# Patient Record
Sex: FEMALE | Race: WHITE | NOT HISPANIC OR LATINO | URBAN - METROPOLITAN AREA
[De-identification: names, ages, dates, MRNs, and addresses within clinical notes are randomized per-mention and may not be internally consistent; named-entity substitution may affect disease eponyms.]

---

## 2018-03-22 ENCOUNTER — OUTPATIENT (OUTPATIENT)
Dept: OUTPATIENT SERVICES | Facility: HOSPITAL | Age: 68
LOS: 1 days | End: 2018-03-22
Payer: COMMERCIAL

## 2018-03-22 VITALS
WEIGHT: 115.96 LBS | RESPIRATION RATE: 16 BRPM | SYSTOLIC BLOOD PRESSURE: 110 MMHG | HEIGHT: 59 IN | HEART RATE: 72 BPM | TEMPERATURE: 98 F | DIASTOLIC BLOOD PRESSURE: 78 MMHG | OXYGEN SATURATION: 99 %

## 2018-03-22 DIAGNOSIS — Z98.890 OTHER SPECIFIED POSTPROCEDURAL STATES: Chronic | ICD-10-CM

## 2018-03-22 DIAGNOSIS — K57.32 DIVERTICULITIS OF LARGE INTESTINE WITHOUT PERFORATION OR ABSCESS WITHOUT BLEEDING: ICD-10-CM

## 2018-03-22 LAB
ANION GAP SERPL CALC-SCNC: 12 MMOL/L — SIGNIFICANT CHANGE UP (ref 5–17)
BLD GP AB SCN SERPL QL: NEGATIVE — SIGNIFICANT CHANGE UP
BUN SERPL-MCNC: 20 MG/DL — SIGNIFICANT CHANGE UP (ref 7–23)
CALCIUM SERPL-MCNC: 9.7 MG/DL — SIGNIFICANT CHANGE UP (ref 8.4–10.5)
CHLORIDE SERPL-SCNC: 102 MMOL/L — SIGNIFICANT CHANGE UP (ref 96–108)
CO2 SERPL-SCNC: 26 MMOL/L — SIGNIFICANT CHANGE UP (ref 22–31)
CREAT SERPL-MCNC: 0.82 MG/DL — SIGNIFICANT CHANGE UP (ref 0.5–1.3)
GLUCOSE SERPL-MCNC: 133 MG/DL — HIGH (ref 70–99)
HBA1C BLD-MCNC: 5.4 % — SIGNIFICANT CHANGE UP (ref 4–5.6)
HCT VFR BLD CALC: 43.2 % — SIGNIFICANT CHANGE UP (ref 34.5–45)
HGB BLD-MCNC: 14 G/DL — SIGNIFICANT CHANGE UP (ref 11.5–15.5)
MCHC RBC-ENTMCNC: 30.2 PG — SIGNIFICANT CHANGE UP (ref 27–34)
MCHC RBC-ENTMCNC: 32.4 GM/DL — SIGNIFICANT CHANGE UP (ref 32–36)
MCV RBC AUTO: 93.1 FL — SIGNIFICANT CHANGE UP (ref 80–100)
NRBC # BLD: 0 /100 WBCS — SIGNIFICANT CHANGE UP (ref 0–0)
PLATELET # BLD AUTO: 242 K/UL — SIGNIFICANT CHANGE UP (ref 150–400)
POTASSIUM SERPL-MCNC: 4.6 MMOL/L — SIGNIFICANT CHANGE UP (ref 3.5–5.3)
POTASSIUM SERPL-SCNC: 4.6 MMOL/L — SIGNIFICANT CHANGE UP (ref 3.5–5.3)
RBC # BLD: 4.64 M/UL — SIGNIFICANT CHANGE UP (ref 3.8–5.2)
RBC # FLD: 13 % — SIGNIFICANT CHANGE UP (ref 10.3–14.5)
RH IG SCN BLD-IMP: POSITIVE — SIGNIFICANT CHANGE UP
SODIUM SERPL-SCNC: 140 MMOL/L — SIGNIFICANT CHANGE UP (ref 135–145)
WBC # BLD: 7.1 K/UL — SIGNIFICANT CHANGE UP (ref 3.8–10.5)
WBC # FLD AUTO: 7.1 K/UL — SIGNIFICANT CHANGE UP (ref 3.8–10.5)

## 2018-03-22 PROCEDURE — 85027 COMPLETE CBC AUTOMATED: CPT

## 2018-03-22 PROCEDURE — 86850 RBC ANTIBODY SCREEN: CPT

## 2018-03-22 PROCEDURE — 83036 HEMOGLOBIN GLYCOSYLATED A1C: CPT

## 2018-03-22 PROCEDURE — 86901 BLOOD TYPING SEROLOGIC RH(D): CPT

## 2018-03-22 PROCEDURE — 86900 BLOOD TYPING SEROLOGIC ABO: CPT

## 2018-03-22 PROCEDURE — G0463: CPT

## 2018-03-22 PROCEDURE — 80048 BASIC METABOLIC PNL TOTAL CA: CPT

## 2018-03-22 RX ORDER — CEFOTETAN DISODIUM 1 G
2 VIAL (EA) INJECTION ONCE
Qty: 0 | Refills: 0 | Status: DISCONTINUED | OUTPATIENT
Start: 2018-03-26 | End: 2018-03-27

## 2018-03-22 RX ORDER — LIDOCAINE HCL 20 MG/ML
0.2 VIAL (ML) INJECTION ONCE
Qty: 0 | Refills: 0 | Status: DISCONTINUED | OUTPATIENT
Start: 2018-03-26 | End: 2018-03-26

## 2018-03-22 RX ORDER — SODIUM CHLORIDE 9 MG/ML
3 INJECTION INTRAMUSCULAR; INTRAVENOUS; SUBCUTANEOUS EVERY 8 HOURS
Qty: 0 | Refills: 0 | Status: DISCONTINUED | OUTPATIENT
Start: 2018-03-26 | End: 2018-03-26

## 2018-03-22 NOTE — H&P PST ADULT - HISTORY OF PRESENT ILLNESS
diverticulitis 67 year old female with recurrent diverticulitis of large intestine with out perforation reports having scheduled for laparoscopic robotic sigmoid resection on 03/26/18.

## 2018-03-22 NOTE — H&P PST ADULT - NSANTHOSAYNRD_GEN_A_CORE
No. HUSSEIN screening performed.  STOP BANG Legend: 0-2 = LOW Risk; 3-4 = INTERMEDIATE Risk; 5-8 = HIGH Risk

## 2018-03-26 ENCOUNTER — INPATIENT (INPATIENT)
Facility: HOSPITAL | Age: 68
LOS: 2 days | Discharge: HOME | DRG: 331 | End: 2018-03-29
Attending: COLON & RECTAL SURGERY | Admitting: COLON & RECTAL SURGERY
Payer: COMMERCIAL

## 2018-03-26 VITALS
WEIGHT: 115.96 LBS | RESPIRATION RATE: 16 BRPM | HEART RATE: 92 BPM | TEMPERATURE: 98 F | DIASTOLIC BLOOD PRESSURE: 80 MMHG | HEIGHT: 59 IN | SYSTOLIC BLOOD PRESSURE: 125 MMHG | OXYGEN SATURATION: 100 %

## 2018-03-26 DIAGNOSIS — K57.32 DIVERTICULITIS OF LARGE INTESTINE WITHOUT PERFORATION OR ABSCESS WITHOUT BLEEDING: ICD-10-CM

## 2018-03-26 DIAGNOSIS — Z98.890 OTHER SPECIFIED POSTPROCEDURAL STATES: Chronic | ICD-10-CM

## 2018-03-26 LAB
GLUCOSE BLDC GLUCOMTR-MCNC: 87 MG/DL — SIGNIFICANT CHANGE UP (ref 70–99)
RH IG SCN BLD-IMP: POSITIVE — SIGNIFICANT CHANGE UP

## 2018-03-26 PROCEDURE — 88307 TISSUE EXAM BY PATHOLOGIST: CPT | Mod: 26

## 2018-03-26 RX ORDER — HEPARIN SODIUM 5000 [USP'U]/ML
5000 INJECTION INTRAVENOUS; SUBCUTANEOUS EVERY 8 HOURS
Qty: 0 | Refills: 0 | Status: DISCONTINUED | OUTPATIENT
Start: 2018-03-26 | End: 2018-03-29

## 2018-03-26 RX ORDER — ACETAMINOPHEN 500 MG
1000 TABLET ORAL ONCE
Qty: 0 | Refills: 0 | Status: COMPLETED | OUTPATIENT
Start: 2018-03-26 | End: 2018-03-26

## 2018-03-26 RX ORDER — MORPHINE SULFATE 50 MG/1
2 CAPSULE, EXTENDED RELEASE ORAL EVERY 4 HOURS
Qty: 0 | Refills: 0 | Status: DISCONTINUED | OUTPATIENT
Start: 2018-03-26 | End: 2018-03-28

## 2018-03-26 RX ORDER — HYDROMORPHONE HYDROCHLORIDE 2 MG/ML
0.5 INJECTION INTRAMUSCULAR; INTRAVENOUS; SUBCUTANEOUS
Qty: 0 | Refills: 0 | Status: DISCONTINUED | OUTPATIENT
Start: 2018-03-26 | End: 2018-03-26

## 2018-03-26 RX ORDER — CETIRIZINE HYDROCHLORIDE 10 MG/1
1 TABLET ORAL
Qty: 0 | Refills: 0 | COMMUNITY

## 2018-03-26 RX ORDER — SODIUM CHLORIDE 9 MG/ML
1000 INJECTION, SOLUTION INTRAVENOUS
Qty: 0 | Refills: 0 | Status: DISCONTINUED | OUTPATIENT
Start: 2018-03-26 | End: 2018-03-27

## 2018-03-26 RX ORDER — HYDROMORPHONE HYDROCHLORIDE 2 MG/ML
1 INJECTION INTRAMUSCULAR; INTRAVENOUS; SUBCUTANEOUS
Qty: 0 | Refills: 0 | Status: DISCONTINUED | OUTPATIENT
Start: 2018-03-26 | End: 2018-03-26

## 2018-03-26 RX ADMIN — HYDROMORPHONE HYDROCHLORIDE 0.5 MILLIGRAM(S): 2 INJECTION INTRAMUSCULAR; INTRAVENOUS; SUBCUTANEOUS at 13:55

## 2018-03-26 RX ADMIN — SODIUM CHLORIDE 100 MILLILITER(S): 9 INJECTION, SOLUTION INTRAVENOUS at 22:12

## 2018-03-26 RX ADMIN — HEPARIN SODIUM 5000 UNIT(S): 5000 INJECTION INTRAVENOUS; SUBCUTANEOUS at 22:12

## 2018-03-26 RX ADMIN — Medication 1000 MILLIGRAM(S): at 16:20

## 2018-03-26 RX ADMIN — Medication 400 MILLIGRAM(S): at 15:50

## 2018-03-26 RX ADMIN — HEPARIN SODIUM 5000 UNIT(S): 5000 INJECTION INTRAVENOUS; SUBCUTANEOUS at 13:29

## 2018-03-26 RX ADMIN — HYDROMORPHONE HYDROCHLORIDE 0.5 MILLIGRAM(S): 2 INJECTION INTRAMUSCULAR; INTRAVENOUS; SUBCUTANEOUS at 13:41

## 2018-03-26 RX ADMIN — SODIUM CHLORIDE 3 MILLILITER(S): 9 INJECTION INTRAMUSCULAR; INTRAVENOUS; SUBCUTANEOUS at 06:19

## 2018-03-26 RX ADMIN — Medication 400 MILLIGRAM(S): at 22:12

## 2018-03-26 NOTE — BRIEF OPERATIVE NOTE - PROCEDURE
<<-----Click on this checkbox to enter Procedure Laparoscopic sigmoidectomy  03/26/2018  robotic assisted  Active  [id number]

## 2018-03-26 NOTE — BRIEF OPERATIVE NOTE - OPERATION/FINDINGS
Robotic mobilization of the sigmoid and left colon. Superior hemorrhoidal vessels taken. Distal margin stapled with robotic stapler. Umbilical incision extended inferiorly and a small wound protector was placed. The proximal margin was taken with an automatic purse string device. The anastomosis was completed laparoscopically, complete donuts and negative leak test.

## 2018-03-27 LAB
ANION GAP SERPL CALC-SCNC: 8 MMOL/L — SIGNIFICANT CHANGE UP (ref 5–17)
BASOPHILS # BLD AUTO: 0.01 K/UL — SIGNIFICANT CHANGE UP (ref 0–0.2)
BASOPHILS NFR BLD AUTO: 0.1 % — SIGNIFICANT CHANGE UP (ref 0–2)
BUN SERPL-MCNC: 10 MG/DL — SIGNIFICANT CHANGE UP (ref 7–23)
CALCIUM SERPL-MCNC: 9.4 MG/DL — SIGNIFICANT CHANGE UP (ref 8.4–10.5)
CHLORIDE SERPL-SCNC: 107 MMOL/L — SIGNIFICANT CHANGE UP (ref 96–108)
CO2 SERPL-SCNC: 27 MMOL/L — SIGNIFICANT CHANGE UP (ref 22–31)
CREAT SERPL-MCNC: 0.84 MG/DL — SIGNIFICANT CHANGE UP (ref 0.5–1.3)
EOSINOPHIL # BLD AUTO: 0 K/UL — SIGNIFICANT CHANGE UP (ref 0–0.5)
EOSINOPHIL NFR BLD AUTO: 0 % — SIGNIFICANT CHANGE UP (ref 0–6)
GLUCOSE SERPL-MCNC: 114 MG/DL — HIGH (ref 70–99)
HCT VFR BLD CALC: 41.2 % — SIGNIFICANT CHANGE UP (ref 34.5–45)
HGB BLD-MCNC: 13.3 G/DL — SIGNIFICANT CHANGE UP (ref 11.5–15.5)
IMM GRANULOCYTES NFR BLD AUTO: 0.2 % — SIGNIFICANT CHANGE UP (ref 0–1.5)
LYMPHOCYTES # BLD AUTO: 1.66 K/UL — SIGNIFICANT CHANGE UP (ref 1–3.3)
LYMPHOCYTES # BLD AUTO: 13.7 % — SIGNIFICANT CHANGE UP (ref 13–44)
MCHC RBC-ENTMCNC: 30.8 PG — SIGNIFICANT CHANGE UP (ref 27–34)
MCHC RBC-ENTMCNC: 32.3 GM/DL — SIGNIFICANT CHANGE UP (ref 32–36)
MCV RBC AUTO: 95.4 FL — SIGNIFICANT CHANGE UP (ref 80–100)
MONOCYTES # BLD AUTO: 0.84 K/UL — SIGNIFICANT CHANGE UP (ref 0–0.9)
MONOCYTES NFR BLD AUTO: 6.9 % — SIGNIFICANT CHANGE UP (ref 2–14)
NEUTROPHILS # BLD AUTO: 9.55 K/UL — HIGH (ref 1.8–7.4)
NEUTROPHILS NFR BLD AUTO: 79.1 % — HIGH (ref 43–77)
PLATELET # BLD AUTO: 229 K/UL — SIGNIFICANT CHANGE UP (ref 150–400)
POTASSIUM SERPL-MCNC: 4.7 MMOL/L — SIGNIFICANT CHANGE UP (ref 3.5–5.3)
POTASSIUM SERPL-SCNC: 4.7 MMOL/L — SIGNIFICANT CHANGE UP (ref 3.5–5.3)
RBC # BLD: 4.32 M/UL — SIGNIFICANT CHANGE UP (ref 3.8–5.2)
RBC # FLD: 13.3 % — SIGNIFICANT CHANGE UP (ref 10.3–14.5)
SODIUM SERPL-SCNC: 142 MMOL/L — SIGNIFICANT CHANGE UP (ref 135–145)
WBC # BLD: 12.09 K/UL — HIGH (ref 3.8–10.5)
WBC # FLD AUTO: 12.09 K/UL — HIGH (ref 3.8–10.5)

## 2018-03-27 RX ORDER — DEXTROSE MONOHYDRATE, SODIUM CHLORIDE, AND POTASSIUM CHLORIDE 50; .745; 4.5 G/1000ML; G/1000ML; G/1000ML
1000 INJECTION, SOLUTION INTRAVENOUS
Qty: 0 | Refills: 0 | Status: DISCONTINUED | OUTPATIENT
Start: 2018-03-27 | End: 2018-03-28

## 2018-03-27 RX ORDER — ACETAMINOPHEN 500 MG
1000 TABLET ORAL ONCE
Qty: 0 | Refills: 0 | Status: COMPLETED | OUTPATIENT
Start: 2018-03-27 | End: 2018-03-27

## 2018-03-27 RX ORDER — MORPHINE SULFATE 50 MG/1
2 CAPSULE, EXTENDED RELEASE ORAL EVERY 6 HOURS
Qty: 0 | Refills: 0 | Status: DISCONTINUED | OUTPATIENT
Start: 2018-03-27 | End: 2018-03-28

## 2018-03-27 RX ORDER — OXYCODONE HYDROCHLORIDE 5 MG/1
5 TABLET ORAL EVERY 4 HOURS
Qty: 0 | Refills: 0 | Status: DISCONTINUED | OUTPATIENT
Start: 2018-03-27 | End: 2018-03-28

## 2018-03-27 RX ORDER — ACETAMINOPHEN 500 MG
650 TABLET ORAL EVERY 6 HOURS
Qty: 0 | Refills: 0 | Status: DISCONTINUED | OUTPATIENT
Start: 2018-03-27 | End: 2018-03-29

## 2018-03-27 RX ORDER — OXYCODONE HYDROCHLORIDE 5 MG/1
10 TABLET ORAL EVERY 6 HOURS
Qty: 0 | Refills: 0 | Status: DISCONTINUED | OUTPATIENT
Start: 2018-03-27 | End: 2018-03-28

## 2018-03-27 RX ADMIN — Medication 400 MILLIGRAM(S): at 11:40

## 2018-03-27 RX ADMIN — OXYCODONE HYDROCHLORIDE 5 MILLIGRAM(S): 5 TABLET ORAL at 20:10

## 2018-03-27 RX ADMIN — MORPHINE SULFATE 2 MILLIGRAM(S): 50 CAPSULE, EXTENDED RELEASE ORAL at 09:40

## 2018-03-27 RX ADMIN — Medication 1000 MILLIGRAM(S): at 12:18

## 2018-03-27 RX ADMIN — Medication 650 MILLIGRAM(S): at 18:28

## 2018-03-27 RX ADMIN — OXYCODONE HYDROCHLORIDE 5 MILLIGRAM(S): 5 TABLET ORAL at 20:40

## 2018-03-27 RX ADMIN — Medication 650 MILLIGRAM(S): at 17:59

## 2018-03-27 RX ADMIN — HEPARIN SODIUM 5000 UNIT(S): 5000 INJECTION INTRAVENOUS; SUBCUTANEOUS at 13:24

## 2018-03-27 RX ADMIN — MORPHINE SULFATE 2 MILLIGRAM(S): 50 CAPSULE, EXTENDED RELEASE ORAL at 15:00

## 2018-03-27 RX ADMIN — HEPARIN SODIUM 5000 UNIT(S): 5000 INJECTION INTRAVENOUS; SUBCUTANEOUS at 21:27

## 2018-03-27 RX ADMIN — MORPHINE SULFATE 2 MILLIGRAM(S): 50 CAPSULE, EXTENDED RELEASE ORAL at 05:25

## 2018-03-27 RX ADMIN — DEXTROSE MONOHYDRATE, SODIUM CHLORIDE, AND POTASSIUM CHLORIDE 50 MILLILITER(S): 50; .745; 4.5 INJECTION, SOLUTION INTRAVENOUS at 08:42

## 2018-03-27 RX ADMIN — SODIUM CHLORIDE 100 MILLILITER(S): 9 INJECTION, SOLUTION INTRAVENOUS at 05:42

## 2018-03-27 RX ADMIN — MORPHINE SULFATE 2 MILLIGRAM(S): 50 CAPSULE, EXTENDED RELEASE ORAL at 14:21

## 2018-03-27 RX ADMIN — MORPHINE SULFATE 2 MILLIGRAM(S): 50 CAPSULE, EXTENDED RELEASE ORAL at 10:15

## 2018-03-27 RX ADMIN — HEPARIN SODIUM 5000 UNIT(S): 5000 INJECTION INTRAVENOUS; SUBCUTANEOUS at 05:42

## 2018-03-27 RX ADMIN — MORPHINE SULFATE 2 MILLIGRAM(S): 50 CAPSULE, EXTENDED RELEASE ORAL at 05:55

## 2018-03-27 RX ADMIN — Medication 1000 MILLIGRAM(S): at 22:42

## 2018-03-28 LAB
HCT VFR BLD CALC: 41.3 % — SIGNIFICANT CHANGE UP (ref 34.5–45)
HGB BLD-MCNC: 13.7 G/DL — SIGNIFICANT CHANGE UP (ref 11.5–15.5)
MAGNESIUM SERPL-MCNC: 1.9 MG/DL — SIGNIFICANT CHANGE UP (ref 1.6–2.6)
MCHC RBC-ENTMCNC: 31.7 PG — SIGNIFICANT CHANGE UP (ref 27–34)
MCHC RBC-ENTMCNC: 33.1 GM/DL — SIGNIFICANT CHANGE UP (ref 32–36)
MCV RBC AUTO: 95.8 FL — SIGNIFICANT CHANGE UP (ref 80–100)
PHOSPHATE SERPL-MCNC: 2.5 MG/DL — SIGNIFICANT CHANGE UP (ref 2.5–4.5)
PLATELET # BLD AUTO: 197 K/UL — SIGNIFICANT CHANGE UP (ref 150–400)
RBC # BLD: 4.31 M/UL — SIGNIFICANT CHANGE UP (ref 3.8–5.2)
RBC # FLD: 12 % — SIGNIFICANT CHANGE UP (ref 10.3–14.5)
WBC # BLD: 8.4 K/UL — SIGNIFICANT CHANGE UP (ref 3.8–10.5)
WBC # FLD AUTO: 8.4 K/UL — SIGNIFICANT CHANGE UP (ref 3.8–10.5)

## 2018-03-28 RX ORDER — HYDROMORPHONE HYDROCHLORIDE 2 MG/ML
2 INJECTION INTRAMUSCULAR; INTRAVENOUS; SUBCUTANEOUS EVERY 4 HOURS
Qty: 0 | Refills: 0 | Status: DISCONTINUED | OUTPATIENT
Start: 2018-03-28 | End: 2018-03-29

## 2018-03-28 RX ORDER — ONDANSETRON 8 MG/1
4 TABLET, FILM COATED ORAL ONCE
Qty: 0 | Refills: 0 | Status: COMPLETED | OUTPATIENT
Start: 2018-03-28 | End: 2018-03-28

## 2018-03-28 RX ORDER — BENZOCAINE AND MENTHOL 5; 1 G/100ML; G/100ML
1 LIQUID ORAL
Qty: 0 | Refills: 0 | Status: DISCONTINUED | OUTPATIENT
Start: 2018-03-28 | End: 2018-03-29

## 2018-03-28 RX ADMIN — Medication 650 MILLIGRAM(S): at 23:17

## 2018-03-28 RX ADMIN — HEPARIN SODIUM 5000 UNIT(S): 5000 INJECTION INTRAVENOUS; SUBCUTANEOUS at 07:44

## 2018-03-28 RX ADMIN — Medication 650 MILLIGRAM(S): at 11:17

## 2018-03-28 RX ADMIN — OXYCODONE HYDROCHLORIDE 5 MILLIGRAM(S): 5 TABLET ORAL at 02:08

## 2018-03-28 RX ADMIN — ONDANSETRON 4 MILLIGRAM(S): 8 TABLET, FILM COATED ORAL at 09:42

## 2018-03-28 RX ADMIN — Medication 650 MILLIGRAM(S): at 17:18

## 2018-03-28 RX ADMIN — HEPARIN SODIUM 5000 UNIT(S): 5000 INJECTION INTRAVENOUS; SUBCUTANEOUS at 21:09

## 2018-03-28 RX ADMIN — HEPARIN SODIUM 5000 UNIT(S): 5000 INJECTION INTRAVENOUS; SUBCUTANEOUS at 13:46

## 2018-03-28 RX ADMIN — OXYCODONE HYDROCHLORIDE 5 MILLIGRAM(S): 5 TABLET ORAL at 01:38

## 2018-03-28 NOTE — ANESTHESIA FOLLOW-UP NOTE - NSEVALATIONFT_GEN_ALL_CORE
c/o mild sore throat, improving. Counseled pt if severe pain or trouble swallowing to contact MD. Also c/o nausea, rx antiemetics.

## 2018-03-29 VITALS
RESPIRATION RATE: 18 BRPM | HEART RATE: 72 BPM | OXYGEN SATURATION: 98 % | SYSTOLIC BLOOD PRESSURE: 123 MMHG | TEMPERATURE: 98 F | DIASTOLIC BLOOD PRESSURE: 27 MMHG

## 2018-03-29 PROCEDURE — 80048 BASIC METABOLIC PNL TOTAL CA: CPT

## 2018-03-29 PROCEDURE — 82962 GLUCOSE BLOOD TEST: CPT

## 2018-03-29 PROCEDURE — 86900 BLOOD TYPING SEROLOGIC ABO: CPT

## 2018-03-29 PROCEDURE — S2900: CPT

## 2018-03-29 PROCEDURE — 88307 TISSUE EXAM BY PATHOLOGIST: CPT

## 2018-03-29 PROCEDURE — 84100 ASSAY OF PHOSPHORUS: CPT

## 2018-03-29 PROCEDURE — 85027 COMPLETE CBC AUTOMATED: CPT

## 2018-03-29 PROCEDURE — 86901 BLOOD TYPING SEROLOGIC RH(D): CPT

## 2018-03-29 PROCEDURE — 83735 ASSAY OF MAGNESIUM: CPT

## 2018-03-29 RX ORDER — ACETAMINOPHEN 500 MG
2 TABLET ORAL
Qty: 0 | Refills: 0 | COMMUNITY
Start: 2018-03-29

## 2018-03-29 RX ADMIN — Medication 650 MILLIGRAM(S): at 05:17

## 2018-03-29 RX ADMIN — Medication 650 MILLIGRAM(S): at 00:00

## 2018-03-29 RX ADMIN — HEPARIN SODIUM 5000 UNIT(S): 5000 INJECTION INTRAVENOUS; SUBCUTANEOUS at 05:17

## 2018-03-29 NOTE — DISCHARGE NOTE ADULT - MEDICATION SUMMARY - MEDICATIONS TO TAKE
I will START or STAY ON the medications listed below when I get home from the hospital:    acetaminophen 325 mg oral tablet  -- 2 tab(s) by mouth every 6 hours, As needed, Mild Pain (1 - 3)  -- Indication: For mild pain    Percocet 5/325 oral tablet  -- 1 tab(s) by mouth every 6 hours MDD:4  -- Caution federal law prohibits the transfer of this drug to any person other  than the person for whom it was prescribed.  May cause drowsiness.  Alcohol may intensify this effect.  Use care when operating dangerous machinery.  This prescription cannot be refilled.  This product contains acetaminophen.  Do not use  with any other product containing acetaminophen to prevent possible liver damage.  Using more of this medication than prescribed may cause serious breathing problems.    -- Indication: For moderate pain    ZyrTEC 10 mg oral tablet  -- 1 tab(s) by mouth once a day, As Needed - for allergy symptoms  -- Indication: For antihistamine

## 2018-03-29 NOTE — DISCHARGE NOTE ADULT - PLAN OF CARE
recover from surgery WOUND CARE: Staples will be removed at follow up office visit.    BATHING: Please do not submerge wound underwater. You may shower and/or sponge bathe.  ACTIVITY: No heavy lifting anything more than 10-15lbs or straining. Otherwise, you may return to your usual level of physical activity. If you are taking narcotic pain medication (such as Percocet), do NOT drive a car, operate machinery or make important decisions.  DIET: Low fiber diet  NOTIFY YOUR SURGEON IF: You have any bleeding that does not stop, any pus draining from your wound, any fever (over 100.4 F) or chills, persistent nausea/vomiting with inability to tolerate food or liquids, persistent diarrhea, or if your pain is not controlled on your discharge pain medications.  FOLLOW-UP:  1. Please call to make a follow-up appointment with Dr. Smith within one week of discharge.   2. Please follow up with your primary care physician in one week regarding your hospitalization.

## 2018-03-29 NOTE — DISCHARGE NOTE ADULT - CARE PROVIDER_API CALL
Tom Smith), ColonRectal Surgery; Surgery  [address] [facility] [address] [place] [address]  Phone: [phone]  Fax: [phone]

## 2018-03-29 NOTE — DISCHARGE NOTE ADULT - INSTRUCTIONS
low fiber diet Instructed to call the MD with surgical site redness, fever, swelling, drainage, pain is not relieved with pain medications,. fever, chills, nausea vomitting and inability to tolerate food

## 2018-03-29 NOTE — DISCHARGE NOTE ADULT - HOSPITAL COURSE
67 year old female with recurrent diverticulitis of large intestine with out perforation reports having scheduled for laparoscopic robotic sigmoid resection on 03/26/18. Pt underwent lap robotic sigmoid resection on 3/26/18, pt tolerated procedure well, was extubated and sent to pacu.  Pt tolerating CLD, had return of GI fxn POD1, perkins removed, passed TOV.  PT had 1 episode of nausea 2/2 po oxy on empty stomach, zofran 4mg iv x 1 given, improved. On POD3, At the time of discharge, the patient was hemodynamically stable, was tolerating PO diet, was voiding urine and passing stool, was ambulating, and was comfortable with adequate pain control. The patient was instructed to follow up with Dr. Smith within 1-2 weeks after discharge from the hospital. The patient felt comfortable with discharge. The patient was discharged to home. The patient had no other issues.

## 2018-03-29 NOTE — DISCHARGE NOTE ADULT - NS AS ACTIVITY OBS
when taking pain medications/Showering allowed/Do not make important decisions/Do not drive or operate machinery/No Heavy lifting/straining

## 2018-03-29 NOTE — PROGRESS NOTE ADULT - ASSESSMENT
Ms. Castillo is a 66 yo woman s/p sigmoid resection. She is hemodynamically stable. Labs are pending .     - DVT ppx: SQH  - Diet: will advance to LRD  - Pain control: Acetaminophen, Morphine  - Monitor GI function  - Replete electrolyte as necessary  - Activity: OOb as tolerated  - Dispo: Home today or possibly tomorrow AM
66 yo F s/p irina sigmoid resection    - advance diet to LRD  - IVL  - cont oral pain meds  -zofran x 1  - d/c later today or tomorrow am      Cecilia WHELAN  CRSSNY
66 yo s/p robotic lower anterior resection  - cont clear liquids for breakfast and lunch if distention less  - oob/amb  - await further GI function   - pain control  - dvt ppx  - d/c perkins - pt would like to keep perkins i explained to her the risk of infection, when pt out of bed remove perkins please    Cecilia WHELAN  CRSSNY
68 yo F POD 3 s/p robotic assisted lap sigmoid resection for diverticulitis.  Doing well, likely d/c home today    - DVT ppx: SQH  - Diet: LRD  - Pain control: Oral pain medications  - Monitor GI function  - Replete electrolyte as necessary  - Activity: OOb as tolerated  - Colace BID  - Dispo: D/c Home today    Red  [id number]
68 yo F s/p irina sigmoid resection    - Tolerating LRD  - IVF  - cont oral pain meds  -zofran x 1  - Colace twice a day  - d/c later today if continues to tolerate, discharge instructions given
Assessment/Plan:  67yFemale with Lap robotic sigmoid resection (diverticulitis) doing well now on floor.     Plan:  -pain control  -DVT ppx  -CLD
Assessment/Plan:  67yFemale with Lap robotic sigmoid resection (diverticulitis) doing well now on floor.     Plan:  -pain control  -DVT ppx  -CLD  -d/c gregorio, TOV    [id number]

## 2018-03-29 NOTE — DISCHARGE NOTE ADULT - PATIENT PORTAL LINK FT
You can access the [facility] Patient Portal, offered by [facility], by registering with the following website: [email or web address]

## 2018-03-29 NOTE — DISCHARGE NOTE ADULT - CARE PLAN
Principal Discharge DX:	Diverticulitis large intestine  Goal:	recover from surgery Principal Discharge DX:	Diverticulitis large intestine  Goal:	recover from surgery  Assessment and plan of treatment:	WOUND CARE: Staples will be removed at follow up office visit.    BATHING: Please do not submerge wound underwater. You may shower and/or sponge bathe.  ACTIVITY: No heavy lifting anything more than 10-15lbs or straining. Otherwise, you may return to your usual level of physical activity. If you are taking narcotic pain medication (such as Percocet), do NOT drive a car, operate machinery or make important decisions.  DIET: Low fiber diet  NOTIFY YOUR SURGEON IF: You have any bleeding that does not stop, any pus draining from your wound, any fever (over 100.4 F) or chills, persistent nausea/vomiting with inability to tolerate food or liquids, persistent diarrhea, or if your pain is not controlled on your discharge pain medications.  FOLLOW-UP:  1. Please call to make a follow-up appointment with Dr. Smith within one week of discharge.   2. Please follow up with your primary care physician in one week regarding your hospitalization.

## 2018-03-29 NOTE — PROGRESS NOTE ADULT - SUBJECTIVE AND OBJECTIVE BOX
GENERAL SURGERY DAILY PROGRESS NOTE:       Subjective:    Pt stable overnight. Was nauseated when she recieved oxy on an empty stomach. Pt states this has happened before. Passing flatus, no bm. Urinating well. ambulating well.   Objective:  Gen- NAD  Abd- non distended, soft, non tender, incisions c/d/i    MEDICATIONS  (STANDING):  dextrose 5% + sodium chloride 0.45% with potassium chloride 20 mEq/L 1000 milliLiter(s) (50 mL/Hr) IV Continuous <Continuous>  heparin  Injectable 5000 Unit(s) SubCutaneous every 8 hours    MEDICATIONS  (PRN):  acetaminophen   Tablet. 650 milliGRAM(s) Oral every 6 hours PRN Mild Pain (1 - 3)  morphine  - Injectable 2 milliGRAM(s) IV Push every 6 hours PRN breakthrough  morphine  - Injectable 2 milliGRAM(s) IV Push every 4 hours PRN Moderate Pain (4 - 6)      Vital Signs Last 24 Hrs  T(C): 36.9 (28 Mar 2018 06:22), Max: 37.2 (27 Mar 2018 13:30)  T(F): 98.4 (28 Mar 2018 06:22), Max: 98.9 (27 Mar 2018 13:30)  HR: 68 (28 Mar 2018 06:22) (68 - 89)  BP: 129/78 (28 Mar 2018 06:22) (101/62 - 129/78)  BP(mean): --  RR: 18 (28 Mar 2018 06:22) (18 - 18)  SpO2: 96% (28 Mar 2018 06:22) (94% - 99%)    I&O's Detail    27 Mar 2018 07:01  -  28 Mar 2018 07:00  --------------------------------------------------------  IN:    dextrose 5% + sodium chloride 0.45% with potassium chloride 20 mEq/L: 600 mL    Oral Fluid: 880 mL  Total IN: 1480 mL    OUT:    Indwelling Catheter - Urethral: 880 mL    Voided: 1525 mL  Total OUT: 2405 mL    Total NET: -925 mL          Daily     Daily     LABS:                        13.3   12.09 )-----------( 229      ( 27 Mar 2018 09:14 )             41.2     03-27    142  |  107  |  10  ----------------------------<  114<H>  4.7   |  27  |  0.84    Ca    9.4      27 Mar 2018 07:12
GENERAL SURGERY DAILY PROGRESS NOTE:       Subjective:  Pt stable overnight. Tolerated clear liquids. Pt states she is passing flatus. No chest pain, no sob. Hesitant to take out her perkins.     Objective:  Gen- NAD  ABD- non tender, mildly distended, soft  incisions c/d/i except for her midline that has some serous staining on the superior portion     MEDICATIONS  (STANDING):  acetaminophen  IVPB. 1000 milliGRAM(s) IV Intermittent once  dextrose 5% + sodium chloride 0.45% with potassium chloride 20 mEq/L 1000 milliLiter(s) (50 mL/Hr) IV Continuous <Continuous>  heparin  Injectable 5000 Unit(s) SubCutaneous every 8 hours    MEDICATIONS  (PRN):  morphine  - Injectable 2 milliGRAM(s) IV Push every 4 hours PRN Moderate Pain (4 - 6)      Vital Signs Last 24 Hrs  T(C): 37.3 (27 Mar 2018 04:54), Max: 37.3 (27 Mar 2018 04:54)  T(F): 99.1 (27 Mar 2018 04:54), Max: 99.1 (27 Mar 2018 04:54)  HR: 84 (27 Mar 2018 04:54) (80 - 99)  BP: 110/67 (27 Mar 2018 04:54) (99/61 - 126/69)  BP(mean): 73 (26 Mar 2018 12:15) (73 - 86)  RR: 18 (27 Mar 2018 04:54) (16 - 18)  SpO2: 94% (27 Mar 2018 04:54) (93% - 100%)    I&O's Detail    26 Mar 2018 07:01  -  27 Mar 2018 07:00  --------------------------------------------------------  IN:    IV PiggyBack: 100 mL    lactated ringers.: 800 mL    Oral Fluid: 240 mL  Total IN: 1140 mL    OUT:    Indwelling Catheter - Urethral: 2215 mL  Total OUT: 2215 mL    Total NET: -1075 mL          Daily     Daily     LABS:
No acute events after surgery. Pain controlled.     PE:    Vital Signs Last 24 Hrs  T(C): 37.2 (27 Mar 2018 13:30), Max: 37.3 (27 Mar 2018 04:54)  T(F): 98.9 (27 Mar 2018 13:30), Max: 99.1 (27 Mar 2018 04:54)  HR: 71 (27 Mar 2018 13:30) (71 - 97)  BP: 108/75 (27 Mar 2018 13:30) (99/61 - 126/69)  BP(mean): --  RR: 18 (27 Mar 2018 13:30) (18 - 18)  SpO2: 97% (27 Mar 2018 13:30) (93% - 99%)      I&O's Detail    26 Mar 2018 07:01  -  26 Mar 2018 16:15  --------------------------------------------------------  IN:    lactated ringers.: 300 mL    Oral Fluid: 120 mL  Total IN: 420 mL    OUT:    Indwelling Catheter - Urethral: 165 mL  Total OUT: 165 mL    Total NET: 255 mL          Gen: No acute distress  Abdominal: Appropriately tender, non distended, dressing c/d/i    Labs:                          13.3   12.09 )-----------( 229      ( 27 Mar 2018 09:14 )             41.2     03-27    142  |  107  |  10  ----------------------------<  114<H>  4.7   |  27  |  0.84    Ca    9.4      27 Mar 2018 07:12
No acute events after surgery. Pain controlled.     PE:  Vital Signs Last 24 Hrs  T(C): 36.5 (26 Mar 2018 15:25), Max: 36.7 (26 Mar 2018 06:05)  T(F): 97.7 (26 Mar 2018 15:25), Max: 98.1 (26 Mar 2018 06:05)  HR: 97 (26 Mar 2018 15:25) (80 - 99)  BP: 114/67 (26 Mar 2018 15:25) (100/55 - 125/80)  BP(mean): 73 (26 Mar 2018 12:15) (73 - 86)  RR: 18 (26 Mar 2018 15:25) (16 - 18)  SpO2: 96% (26 Mar 2018 15:25) (95% - 100%    I&O's Detail    26 Mar 2018 07:01  -  26 Mar 2018 16:15  --------------------------------------------------------  IN:    lactated ringers.: 300 mL    Oral Fluid: 120 mL  Total IN: 420 mL    OUT:    Indwelling Catheter - Urethral: 165 mL  Total OUT: 165 mL    Total NET: 255 mL          Gen: No acute distress  Abdominal: Appropriately tender, non distended, dressing c/d/i    Labs:
Pt stable overnight. Her nausea has resolved.  She is passing flatus and no bm.  She is tolerating a diet.  She has been ambulating.  Gen- NAD  Abd-minimal distended, soft, non tender, incisions c/d/i minimal ecchymosis    MEDICATIONS  (STANDING):  dextrose 5% + sodium chloride 0.45% with potassium chloride 20 mEq/L 1000 milliLiter(s) (50 mL/Hr) IV Continuous <Continuous>  heparin  Injectable 5000 Unit(s) SubCutaneous every 8 hours    MEDICATIONS  (PRN):  acetaminophen   Tablet. 650 milliGRAM(s) Oral every 6 hours PRN Mild Pain (1 - 3)  morphine  - Injectable 2 milliGRAM(s) IV Push every 6 hours PRN breakthrough  morphine  - Injectable 2 milliGRAM(s) IV Push every 4 hours PRN Moderate Pain (4 - 6)
RED TEAM GENERAL SURGERY DAILY PROGRESS NOTE:     Subjective:  Patient seen & examined in AM  No acute events overnight  Passing flatus and BM.  OOB and AMbulating  No further nausea.  Tolerating diet.  Pain is controlled.    Objective:    PE:  Gen: NAD  Resp: airway patent, respirations unlabored, no increased WoB  CVS: RRR  Abd- minimal distended, soft, non tender, incisions c/d/i minimal ecchymosis  Ext: no edema, WWP  Neuro: AAOx3, no focal deficits    Vital Signs Last 24 Hrs  T(C): 37.1 (29 Mar 2018 05:44), Max: 37.2 (28 Mar 2018 22:45)  T(F): 98.7 (29 Mar 2018 05:44), Max: 98.9 (28 Mar 2018 22:45)  HR: 84 (29 Mar 2018 05:44) (76 - 88)  BP: 118/75 (29 Mar 2018 05:44) (115/65 - 120/69)  BP(mean): --  RR: 18 (29 Mar 2018 05:44) (18 - 18)  SpO2: 95% (29 Mar 2018 05:44) (94% - 96%)    I&O's Detail    28 Mar 2018 07:01  -  29 Mar 2018 07:00  --------------------------------------------------------  IN:    Oral Fluid: 620 mL  Total IN: 620 mL    OUT:    Voided: 1300 mL  Total OUT: 1300 mL    Total NET: -680 mL    Daily     MEDICATIONS  (STANDING):  heparin  Injectable 5000 Unit(s) SubCutaneous every 8 hours    MEDICATIONS  (PRN):  acetaminophen   Tablet. 650 milliGRAM(s) Oral every 6 hours PRN Mild Pain (1 - 3)  benzocaine 15 mG/menthol 3.6 mG Lozenge 1 Lozenge Oral every 3 hours PRN Sore Throat  HYDROmorphone   Tablet 2 milliGRAM(s) Oral every 4 hours PRN Moderate Pain (4 - 6)      LABS:                        13.7   8.4   )-----------( 197      ( 28 Mar 2018 10:23 )             41.3       Phos  2.5     03-28  Mg     1.9     03-28
Surgery Red Team Progress Note    STATUS POST:  Lap robotic sigmoid resection (diverticulitis)    POST OPERATIVE DAY # 2    SUBJECTIVE:   Patient was seen and examined this morning. There was no acute event overnight. She is resting comfortably in bed. Pain is well controlled. She had mild nausea yesterday when she received oxycodone. No emesis. She does not report pain, fever, n/v, chest pain, or shortness of breath. Patient has flatus, but no bowel movement. tolerating CLD    OBJECTIVE:   T(C): 36.9 (03-28-18 @ 06:22), Max: 37.2 (03-27-18 @ 13:30)  HR: 68 (03-28-18 @ 06:22) (68 - 89)  BP: 129/78 (03-28-18 @ 06:22) (101/62 - 129/78)  RR: 18 (03-28-18 @ 06:22) (18 - 18)  SpO2: 96% (03-28-18 @ 06:22) (94% - 99%)  Wt(kg): --  CAPILLARY BLOOD GLUCOSE        I&O's Detail    27 Mar 2018 07:01  -  28 Mar 2018 07:00  --------------------------------------------------------  IN:    dextrose 5% + sodium chloride 0.45% with potassium chloride 20 mEq/L: 600 mL    Oral Fluid: 880 mL  Total IN: 1480 mL    OUT:    Indwelling Catheter - Urethral: 880 mL    Voided: 1525 mL  Total OUT: 2405 mL    Total NET: -925 mL        PHYSICAL EXAM:  Gen: Well-nourished, well-developed, A&O x3, resting in bed in no acute distress  CV: RRR  Resp: Patent airways, unlabored   Abdomen: Soft, nontender, nondistended, incision: clean/ Dry/ Intact   Extremities: All 4 extremities warm and well perfused, no edema

## 2018-03-30 LAB — SURGICAL PATHOLOGY STUDY: SIGNIFICANT CHANGE UP

## 2021-03-09 ENCOUNTER — OFFICE (OUTPATIENT)
Dept: URBAN - METROPOLITAN AREA TELEHEALTH 12 | Facility: TELEHEALTH | Age: 71
End: 2021-03-09

## 2021-03-09 VITALS — HEIGHT: 59 IN | WEIGHT: 115 LBS

## 2021-03-09 DIAGNOSIS — K57.92 DIVERTICULITIS OF INTESTINE, PART UNSPECIFIED, WITHOUT PERFO: ICD-10-CM

## 2021-03-09 PROCEDURE — 99204 OFFICE O/P NEW MOD 45 MIN: CPT | Mod: 95 | Performed by: NURSE PRACTITIONER

## 2021-03-09 RX ORDER — SULFAMETHOXAZOLE AND TRIMETHOPRIM 800; 160 MG/1; MG/1
1600 TABLET ORAL
Qty: 14 | Refills: 0 | Status: COMPLETED
Start: 2021-03-09 | End: 2021-05-07

## 2021-03-09 NOTE — SERVICEHPINOTES
PATIENT VERIFIED BY DATE OF BIRTH AND NAME. Patient has been consented for this telecommunication visit. Reports a longstanding hx of diverticular disease. Hx of partial colectomy 3 years ago. Last week, had diverticulitis flare up. Visited ER and the CT was consistent with uncomplicated diverticulitis. The patient was prescribed with Augmentin but the pain did not get any better after taking it for 6 days. Visited urgent care and prescribed with cipro and flagyl. Hx of Levaquin allergy, yesterday developed throat closing symptoms after taking cipro and tool Benadryl. BRThe pain is not better. BMs are okay. Denies blood in stool, melena or weight loss. BR+ Nausea without vomiting. BRLast colonoscopy was done in 03/2020 in [place]. Dr. Carlos and told to have severe diverticulosis. BRPrevious bout of diverticulitis was in Sep 2020. The previous CT was done at [facility] in [facility]. Denies family hx of colon cancer. Denies personal hx of cardiovascular disease.BR

## 2021-05-07 ENCOUNTER — OFFICE (OUTPATIENT)
Dept: URBAN - METROPOLITAN AREA TELEHEALTH 12 | Facility: TELEHEALTH | Age: 71
End: 2021-05-07

## 2021-05-07 VITALS — HEIGHT: 59 IN | WEIGHT: 114 LBS

## 2021-05-07 DIAGNOSIS — K57.92 DIVERTICULITIS OF INTESTINE, PART UNSPECIFIED, WITHOUT PERFO: ICD-10-CM

## 2021-05-07 DIAGNOSIS — K86.2 CYST OF PANCREAS: ICD-10-CM

## 2021-05-07 DIAGNOSIS — K59.09 OTHER CONSTIPATION: ICD-10-CM

## 2021-05-07 PROCEDURE — 99214 OFFICE O/P EST MOD 30 MIN: CPT | Mod: 95 | Performed by: INTERNAL MEDICINE

## 2021-05-07 NOTE — SERVICEHPINOTES
PATIENT VERIFIED BY DATE OF BIRTH AND NAME. Patient has been consented for this telecommunication visit. Pt presents for follow up of diverticulitis. Currently symptom-free, aside from some abdominal bloating, though after a prolonged episode of diverticulitis 3/2021. Chronic issues with diverticulitis for several years, and previously required resection did well for a year ago afterwards, but has continued to have issues. Relocated to the area 12/2020, then shortly afterwards had recurrent diverticulitis. Last episode 3/2021 - seen in ER treated initially with augmentin (usually treated with this and flagyl) felt worse, and then saw urgent care who prescribed cipro (due to fluoroquinolone allergy she had a reaction, stopped this). Recently saw Kibbum, Bactrim added to flagyl. Finally starting to improve. Typical pattern for her is: onset of symptoms, calls her prior GI MD, gets acute evaluation followed by antibiotics, sometimes CT, then improves within 24-48 hours. CT 4/12/2021 showed moderate stool burden, but was otherwise unremarkable previous diverticulitis in transverse colon was noted to have resolved. Due to "stool burden" she tried miralax for a week or so, not sure it helped.Usually has 2 BM/day during period of symptom-free. Solid stools, no blood or mucous in stools. No urgency or straining, but usually has tenesmus/incomplete evacuation. No abdominal pain or cramping. Not on any type of laxative or fiber supplement diet is relatively high in fiber though. Interested in working with nutritionist.No identifiable trigger to episodes of diverticulitis.Colonoscopy 3/2020 showed a total of 5 polyps (ascending colon, descending colon, rectum only 2/5 adenomatous), diverticulosis (pan colonic), healthy-appearing colorectal anastomosis, and internal hemorrhoids.Reportedly has also been followed for benign cystic lesion on pancreas (though none seen on recent CT?).10-point ROS completed with patient, pertinent positives/negatives outlined above.

## 2021-06-16 ENCOUNTER — OFFICE (OUTPATIENT)
Dept: URBAN - METROPOLITAN AREA CLINIC 102 | Facility: CLINIC | Age: 71
End: 2021-06-16

## 2021-06-16 VITALS
SYSTOLIC BLOOD PRESSURE: 101 MMHG | WEIGHT: 119 LBS | HEART RATE: 97 BPM | HEIGHT: 59 IN | TEMPERATURE: 97.6 F | DIASTOLIC BLOOD PRESSURE: 89 MMHG

## 2021-06-16 DIAGNOSIS — K59.09 OTHER CONSTIPATION: ICD-10-CM

## 2021-06-16 DIAGNOSIS — K57.92 DIVERTICULITIS OF INTESTINE, PART UNSPECIFIED, WITHOUT PERFO: ICD-10-CM

## 2021-06-16 DIAGNOSIS — K86.2 CYST OF PANCREAS: ICD-10-CM

## 2021-06-16 PROCEDURE — 99213 OFFICE O/P EST LOW 20 MIN: CPT | Performed by: INTERNAL MEDICINE

## 2021-10-01 ENCOUNTER — OFFICE (OUTPATIENT)
Dept: URBAN - METROPOLITAN AREA CLINIC 102 | Facility: CLINIC | Age: 71
End: 2021-10-01

## 2021-10-01 VITALS
TEMPERATURE: 98.2 F | WEIGHT: 118 LBS | HEART RATE: 87 BPM | SYSTOLIC BLOOD PRESSURE: 122 MMHG | HEIGHT: 59 IN | DIASTOLIC BLOOD PRESSURE: 68 MMHG

## 2021-10-01 DIAGNOSIS — K57.92 DIVERTICULITIS OF INTESTINE, PART UNSPECIFIED, WITHOUT PERFO: ICD-10-CM

## 2021-10-01 DIAGNOSIS — K86.2 CYST OF PANCREAS: ICD-10-CM

## 2021-10-01 DIAGNOSIS — K59.09 OTHER CONSTIPATION: ICD-10-CM

## 2021-10-01 PROCEDURE — 99213 OFFICE O/P EST LOW 20 MIN: CPT | Performed by: INTERNAL MEDICINE

## 2021-11-17 NOTE — DISCHARGE NOTE ADULT - NURSING SECTION COMPLETE
- hx of aortic stenosis  - f/u echo  - cardiology consulted, Dr Driscoll - hx of aortic stenosis  - f/u echo  - cardiology consulted, Dr Driscoll Patient/Caregiver provided printed discharge information.

## 2022-01-27 ENCOUNTER — OFFICE (OUTPATIENT)
Dept: URBAN - METROPOLITAN AREA CLINIC 34 | Facility: CLINIC | Age: 72
End: 2022-01-27

## 2022-01-27 VITALS
WEIGHT: 119 LBS | SYSTOLIC BLOOD PRESSURE: 106 MMHG | HEART RATE: 87 BPM | DIASTOLIC BLOOD PRESSURE: 75 MMHG | HEIGHT: 59 IN

## 2022-01-27 DIAGNOSIS — R10.32 LEFT LOWER QUADRANT PAIN: ICD-10-CM

## 2022-01-27 DIAGNOSIS — K57.92 DIVERTICULITIS OF INTESTINE, PART UNSPECIFIED, WITHOUT PERFO: ICD-10-CM

## 2022-01-27 PROCEDURE — 99213 OFFICE O/P EST LOW 20 MIN: CPT | Performed by: INTERNAL MEDICINE

## 2022-09-06 ENCOUNTER — OFFICE (OUTPATIENT)
Dept: URBAN - METROPOLITAN AREA CLINIC 34 | Facility: CLINIC | Age: 72
End: 2022-09-06

## 2022-09-06 VITALS
WEIGHT: 117 LBS | DIASTOLIC BLOOD PRESSURE: 77 MMHG | HEART RATE: 70 BPM | HEIGHT: 59 IN | TEMPERATURE: 97.3 F | SYSTOLIC BLOOD PRESSURE: 113 MMHG

## 2022-09-06 DIAGNOSIS — R10.32 LEFT LOWER QUADRANT PAIN: ICD-10-CM

## 2022-09-06 DIAGNOSIS — K57.92 DIVERTICULITIS OF INTESTINE, PART UNSPECIFIED, WITHOUT PERFO: ICD-10-CM

## 2022-09-06 PROCEDURE — 99213 OFFICE O/P EST LOW 20 MIN: CPT | Performed by: INTERNAL MEDICINE

## 2022-09-13 ENCOUNTER — OFFICE (OUTPATIENT)
Dept: URBAN - METROPOLITAN AREA TELEHEALTH 12 | Facility: TELEHEALTH | Age: 72
End: 2022-09-13

## 2022-09-13 VITALS — HEIGHT: 59 IN | WEIGHT: 115 LBS

## 2022-09-13 DIAGNOSIS — K57.92 DIVERTICULITIS OF INTESTINE, PART UNSPECIFIED, WITHOUT PERFO: ICD-10-CM

## 2022-09-13 DIAGNOSIS — R10.32 LEFT LOWER QUADRANT PAIN: ICD-10-CM

## 2022-09-13 PROCEDURE — 99214 OFFICE O/P EST MOD 30 MIN: CPT | Mod: 95 | Performed by: PHYSICIAN ASSISTANT

## 2022-09-13 NOTE — SERVICENOTES
I have reviewed the history, physical exam, assessment and management plans.  I concur with or have edited all elements of her note., Patient's visit was conducted through video telecommunication. Patient consented before the start of visit as to understanding of privacy concerns, possible technological failure, and their responsibility of carrying out instructions of plan.  Provider was located in their home during this visit.

## 2022-09-13 NOTE — SERVICEHPINOTES
PATIENT VERIFIED BY DATE OF BIRTH AND NAME. Patient has been consented for this telecommunication visit.   Pt is a 71 yr old female here for f/u. She saw Dr. Berry 9/6/22 for diverticulitis.   She has experienced several episodes of CT-confirmed diverticulitis recently in various parts of the left colon. Each has required antibiotics, each has improved after antibiotics but then only for a few weeks. No obvious/clear trigger to episodes. After each antibiotic, she'll have variable BM frequency/consistency that eventually resolves. Finished antibiotics two weeks ago, but still having issues.  Clusters of 4-5 BM over an hour loose stools, no blood no mucous urgency, close calls, but no incontinence some tenesmus no nocturnal BM. She tells me abdominal pain has been ongoing. She states that pain was not gone at her last visit with Dr. Berry. 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
br
br
Pt avoids NSAIDS. She is on Culturelle now. She tells me that she was never really pain free when she saw Dr. Berry on 9/6/22. Pain is more of a dull ache. Now a minimal pain. She takes Tylenol as needed. No fever or chills. No nausea or vomiting. Pain was 8/10 a few nights ago but now it is more 6/10--not affecting her daily life. She is wondering if she should resume abx again. Has a flex sig with colon and rectal surgery in October 2022. 
br
br
ROS as per HPI and otherwise is unremarkable.

## 2023-03-30 ENCOUNTER — OFFICE (OUTPATIENT)
Dept: URBAN - METROPOLITAN AREA CLINIC 102 | Facility: CLINIC | Age: 73
End: 2023-03-30

## 2023-03-30 VITALS
HEIGHT: 59 IN | TEMPERATURE: 97.3 F | SYSTOLIC BLOOD PRESSURE: 114 MMHG | HEART RATE: 81 BPM | WEIGHT: 112 LBS | DIASTOLIC BLOOD PRESSURE: 76 MMHG

## 2023-03-30 DIAGNOSIS — K86.2 CYST OF PANCREAS: ICD-10-CM

## 2023-03-30 DIAGNOSIS — K62.89 OTHER SPECIFIED DISEASES OF ANUS AND RECTUM: ICD-10-CM

## 2023-03-30 DIAGNOSIS — L29.0 PRURITUS ANI: ICD-10-CM

## 2023-03-30 PROCEDURE — 99214 OFFICE O/P EST MOD 30 MIN: CPT | Performed by: INTERNAL MEDICINE

## 2024-02-09 ENCOUNTER — OFFICE (OUTPATIENT)
Dept: URBAN - METROPOLITAN AREA CLINIC 79 | Facility: CLINIC | Age: 74
End: 2024-02-09

## 2024-02-09 VITALS
TEMPERATURE: 97.9 F | HEART RATE: 90 BPM | WEIGHT: 115 LBS | DIASTOLIC BLOOD PRESSURE: 80 MMHG | HEIGHT: 59 IN | SYSTOLIC BLOOD PRESSURE: 109 MMHG

## 2024-02-09 DIAGNOSIS — R20.8 OTHER DISTURBANCES OF SKIN SENSATION: ICD-10-CM

## 2024-02-09 DIAGNOSIS — L29.0 PRURITUS ANI: ICD-10-CM

## 2024-02-09 DIAGNOSIS — Z86.010 PERSONAL HISTORY OF COLONIC POLYPS: ICD-10-CM

## 2024-02-09 DIAGNOSIS — K21.9 GASTRO-ESOPHAGEAL REFLUX DISEASE WITHOUT ESOPHAGITIS: ICD-10-CM

## 2024-02-09 PROCEDURE — 99214 OFFICE O/P EST MOD 30 MIN: CPT | Performed by: INTERNAL MEDICINE

## 2024-05-09 ENCOUNTER — AMBULATORY SURGICAL CENTER (OUTPATIENT)
Dept: URBAN - METROPOLITAN AREA SURGERY 23 | Facility: SURGERY | Age: 74
End: 2024-05-09
Payer: MEDICARE

## 2024-05-09 DIAGNOSIS — K62.1 RECTAL POLYP: ICD-10-CM

## 2024-05-09 DIAGNOSIS — Z09 ENCOUNTER FOR FOLLOW-UP EXAMINATION AFTER COMPLETED TREATMEN: ICD-10-CM

## 2024-05-09 DIAGNOSIS — D12.2 BENIGN NEOPLASM OF ASCENDING COLON: ICD-10-CM

## 2024-05-09 DIAGNOSIS — Z86.010 PERSONAL HISTORY OF COLONIC POLYPS: ICD-10-CM

## 2024-05-09 DIAGNOSIS — K64.9 UNSPECIFIED HEMORRHOIDS: ICD-10-CM

## 2024-05-09 DIAGNOSIS — K57.30 DIVERTICULOSIS OF LARGE INTESTINE WITHOUT PERFORATION OR ABS: ICD-10-CM

## 2024-05-09 PROCEDURE — 45380 COLONOSCOPY AND BIOPSY: CPT | Mod: PT | Performed by: INTERNAL MEDICINE

## 2024-12-05 ENCOUNTER — OFFICE (OUTPATIENT)
Dept: URBAN - METROPOLITAN AREA CLINIC 79 | Facility: CLINIC | Age: 74
End: 2024-12-05
Payer: COMMERCIAL

## 2024-12-05 VITALS
SYSTOLIC BLOOD PRESSURE: 117 MMHG | HEIGHT: 59 IN | DIASTOLIC BLOOD PRESSURE: 83 MMHG | TEMPERATURE: 96.4 F | HEART RATE: 79 BPM | WEIGHT: 115 LBS

## 2024-12-05 DIAGNOSIS — K57.92 DIVERTICULITIS OF INTESTINE, PART UNSPECIFIED, WITHOUT PERFO: ICD-10-CM

## 2024-12-05 DIAGNOSIS — R10.32 LEFT LOWER QUADRANT PAIN: ICD-10-CM

## 2024-12-05 DIAGNOSIS — Z90.49 ACQUIRED ABSENCE OF OTHER SPECIFIED PARTS OF DIGESTIVE TRACT: ICD-10-CM

## 2024-12-05 PROCEDURE — 99214 OFFICE O/P EST MOD 30 MIN: CPT | Performed by: INTERNAL MEDICINE

## 2024-12-05 RX ORDER — MESALAMINE 1.2 G/1
4.8 TABLET, DELAYED RELEASE ORAL
Qty: 120 | Refills: 5 | Status: ACTIVE
Start: 2024-12-05

## 2025-02-12 ENCOUNTER — OFFICE (OUTPATIENT)
Dept: URBAN - METROPOLITAN AREA CLINIC 102 | Facility: CLINIC | Age: 75
End: 2025-02-12
Payer: COMMERCIAL

## 2025-02-12 VITALS
WEIGHT: 121 LBS | HEART RATE: 81 BPM | DIASTOLIC BLOOD PRESSURE: 82 MMHG | HEIGHT: 59 IN | TEMPERATURE: 97.8 F | SYSTOLIC BLOOD PRESSURE: 139 MMHG

## 2025-02-12 DIAGNOSIS — K86.2 CYST OF PANCREAS: ICD-10-CM

## 2025-02-12 DIAGNOSIS — R10.32 LEFT LOWER QUADRANT PAIN: ICD-10-CM

## 2025-02-12 PROCEDURE — 99214 OFFICE O/P EST MOD 30 MIN: CPT | Performed by: INTERNAL MEDICINE
